# Patient Record
Sex: MALE | Race: WHITE | NOT HISPANIC OR LATINO | URBAN - METROPOLITAN AREA
[De-identification: names, ages, dates, MRNs, and addresses within clinical notes are randomized per-mention and may not be internally consistent; named-entity substitution may affect disease eponyms.]

---

## 2023-12-01 ENCOUNTER — OFFICE VISIT (OUTPATIENT)
Dept: URGENT CARE | Facility: MEDICAL CENTER | Age: 28
End: 2023-12-01
Payer: COMMERCIAL

## 2023-12-01 VITALS
HEART RATE: 109 BPM | SYSTOLIC BLOOD PRESSURE: 138 MMHG | DIASTOLIC BLOOD PRESSURE: 77 MMHG | TEMPERATURE: 100.5 F | OXYGEN SATURATION: 97 % | RESPIRATION RATE: 18 BRPM

## 2023-12-01 DIAGNOSIS — J02.9 SORE THROAT: ICD-10-CM

## 2023-12-01 DIAGNOSIS — K11.20 PAROTITIS: Primary | ICD-10-CM

## 2023-12-01 DIAGNOSIS — U07.1 COVID-19: ICD-10-CM

## 2023-12-01 LAB
SARS-COV-2 AG UPPER RESP QL IA: POSITIVE
VALID CONTROL: ABNORMAL

## 2023-12-01 PROCEDURE — 87811 SARS-COV-2 COVID19 W/OPTIC: CPT

## 2023-12-01 PROCEDURE — 99213 OFFICE O/P EST LOW 20 MIN: CPT

## 2023-12-01 RX ORDER — AMOXICILLIN AND CLAVULANATE POTASSIUM 875; 125 MG/1; MG/1
1 TABLET, FILM COATED ORAL EVERY 12 HOURS SCHEDULED
Qty: 20 TABLET | Refills: 0 | Status: SHIPPED | OUTPATIENT
Start: 2023-12-01 | End: 2023-12-11

## 2023-12-01 RX ORDER — METHYLPREDNISOLONE 4 MG/1
TABLET ORAL
Qty: 21 TABLET | Refills: 0 | Status: SHIPPED | OUTPATIENT
Start: 2023-12-01

## 2023-12-01 NOTE — PROGRESS NOTES
[facility]        NAME: Baltazar Leung is a 33 y.o. male  : 1995    MRN: [id number]  DATE: 2023  TIME: 6:39 PM      Assessment and Plan     Parotitis [K11.20]  1. Parotitis  amoxicillin-clavulanate (AUGMENTIN) 875-125 mg per tablet    methylPREDNISolone 4 MG tablet therapy pack    Ambulatory Referral to Otolaryngology      2. Sore throat  Poct Covid 19 Rapid Antigen Test      3. COVID-19            Rapid covid positive. Patient aware. Patient educated and verbalizes understanding to proceed to the Er if symptoms worsen. Patient Instructions     Take antibiotic as prescribed. Recommend probiotic use while taking antibiotic. Take steroids as directed. Recommend to take them in the morning and with food. Recommend sucking on sour lozenges. Hydration and rest.  Acetaminophen and ibuprofen for pain relief and fever reduction. Follow-up with ENT. PCP follow up in 3-5 days. Go to an emergency department if difficulty breathing occurs or if symptoms worsen. Recommended supplements for COVID-19 is the following: Vitamin D3 2000 IU  daily ,  Vitamin C 1g  every 12 hours , Multivitamin Daily   Chief Complaint     Chief Complaint   Patient presents with    Cold Like Symptoms     Patient c/o sore throat , right ear swelling and fever x 4 days          History of Present Illness     Patient is a 71-year-old male who presents with viral symptoms for 2 weeks. States 3 days ago he started with a sore throat. Reports swelling to the right side of his face in front of his ear. Reports fever and chills. Denies trouble swallowing. Denies drooling. Denies recent antibiotic use. Reports sensation of dry mouth. Review of Systems     Review of Systems   Constitutional:  Positive for chills and fever. HENT:  Positive for congestion, facial swelling and sore throat. Negative for drooling and trouble swallowing. Gastrointestinal:  Negative for diarrhea, nausea and vomiting. All other systems reviewed and are negative. Current Medications       Current Outpatient Medications:     amoxicillin-clavulanate (AUGMENTIN) 875-125 mg per tablet, Take 1 tablet by mouth every 12 (twelve) hours for 10 days, Disp: 20 tablet, Rfl: 0    methylPREDNISolone 4 MG tablet therapy pack, Use as directed on package, Disp: 21 tablet, Rfl: 0    Current Allergies     Allergies as of 12/01/2023    (No Known Allergies)              The following portions of the patient's history were reviewed and updated as appropriate: allergies, current medications, past family history, past medical history, past social history, past surgical history and problem list.     No past medical history on file. No past surgical history on file. No family history on file. Medications have been verified. Objective     /77   Pulse (!) 109   Temp 100.5 °F (38.1 °C)   Resp 18   SpO2 97%   No LMP for male patient. Physical Exam     Physical Exam  Vitals and nursing note reviewed. Constitutional:       General: He is not in acute distress. Appearance: Normal appearance. He is not ill-appearing, toxic-appearing or diaphoretic. HENT:      Head:      Jaw: No trismus. Right Ear: Tympanic membrane, ear canal and external ear normal.      Left Ear: Tympanic membrane, ear canal and external ear normal.      Nose: Congestion present. Mouth/Throat:      Lips: Pink. Mouth: Mucous membranes are moist.      Pharynx: Oropharynx is clear. Uvula midline. No oropharyngeal exudate or posterior oropharyngeal erythema. Tonsils: No tonsillar exudate or tonsillar abscesses. 1+ on the right. 1+ on the left. Cardiovascular:      Rate and Rhythm: Normal rate. Pulses: Normal pulses. Heart sounds: Normal heart sounds, S1 normal and S2 normal.   Pulmonary:      Effort: Pulmonary effort is normal.      Breath sounds: Normal breath sounds and air entry.  No stridor, decreased air movement or transmitted upper airway sounds. No decreased breath sounds, wheezing, rhonchi or rales. Skin:     General: Skin is warm. Capillary Refill: Capillary refill takes less than 2 seconds. Neurological:      General: No focal deficit present. Mental Status: He is alert. Psychiatric:         Mood and Affect: Mood normal.         Behavior: Behavior normal.         Thought Content:  Thought content normal.         Judgment: Judgment normal.

## 2023-12-01 NOTE — PATIENT INSTRUCTIONS
Take antibiotic as prescribed. Recommend probiotic use while taking antibiotic. Take steroids as directed. Recommend to take them in the morning and with food. Recommend sucking on sour lozenges. Hydration and rest.  Acetaminophen and ibuprofen for pain relief and fever reduction. Follow-up with ENT. PCP follow up in 3-5 days. Go to an emergency department if difficulty breathing occurs or if symptoms worsen.     Recommended supplements for COVID-19 is the following: Vitamin D3 2000 IU  daily ,  Vitamin C 1g  every 12 hours , Multivitamin Daily

## 2024-10-29 ENCOUNTER — OFFICE VISIT (OUTPATIENT)
Dept: DENTISTRY | Facility: CLINIC | Age: 29
End: 2024-10-29

## 2024-10-29 VITALS — HEART RATE: 72 BPM | TEMPERATURE: 99.3 F | DIASTOLIC BLOOD PRESSURE: 86 MMHG | SYSTOLIC BLOOD PRESSURE: 124 MMHG

## 2024-10-29 DIAGNOSIS — Z01.21 ENCOUNTER FOR DENTAL EXAMINATION AND CLEANING WITH ABNORMAL FINDINGS: Primary | ICD-10-CM

## 2024-10-29 PROCEDURE — D0220 INTRAORAL - PERIAPICAL FIRST RADIOGRAPHIC IMAGE: HCPCS | Performed by: DENTAL HYGIENIST

## 2024-10-29 PROCEDURE — D0140 LIMITED ORAL EVALUATION - PROBLEM FOCUSED: HCPCS | Performed by: DENTIST

## 2024-10-29 NOTE — DENTAL PROCEDURE DETAILS
Subjective   Patient ID: Terence Foster is a 29 y.o. male.  Chief Complaint   Patient presents with    Emergency/limited Exam     Reviewed medical history   ASA I  Pain - 0    CC:  My front tooth chipped - no pain but I have the piece that chipped    Today;  Limited Exam, 1 PA #9    DX:   Upon clinical and xray examination tooth #9 lost a DL restoration.  Incisal edge is chipped but very slight.  ---Will bring pt back to restore #9 and recommended he return for a Comprehensive exam and FMX    Exam:  Dr. Dale  Referral:  none    NV1:  Rest 9 - DL - -60 min - ASAP w/ any provider  NV2:   Comp, FMX, FMP - 50 min

## 2024-11-07 ENCOUNTER — OFFICE VISIT (OUTPATIENT)
Dept: DENTISTRY | Facility: CLINIC | Age: 29
End: 2024-11-07

## 2024-11-07 VITALS — HEART RATE: 56 BPM | DIASTOLIC BLOOD PRESSURE: 84 MMHG | TEMPERATURE: 99.3 F | SYSTOLIC BLOOD PRESSURE: 133 MMHG

## 2024-11-07 DIAGNOSIS — K02.9 CARIES: Primary | ICD-10-CM

## 2024-11-07 PROCEDURE — D2331 RESIN-BASED COMPOSITE - 2 SURFACES, ANTERIOR: HCPCS | Performed by: DENTIST

## 2024-11-07 NOTE — PROGRESS NOTES
Composite Filling #9-DL    Terence Foster presents for composite filling. PMH reviewed, no changes.  CC- chipped tooth; no discomfort; confirmed #9=DL filling missing.  ASA I  *reviewed Med Hx  Pain Scale 0  Soft tissue: neg    Discussed with patient need for RCT if pulp exposure occurs or in future if pulp is inflamed. Pt understands and consents.    Applied topical benzocaine, administered one half carp 4% Articaine with 1:100,000 Epi Max Ant infiltration    Prepped tooth #9-DL comp with 245 carbide on high speed.  Placed clear matrix.and wedge. Removed.Isolation with cotton rolls and dri-angles    Etch with 37% H2PO4, rinse, dry. Applied Adhese with 20 second scrub once, gentle air dry and light cured for 10s. Restored with Tetric bulk warren shade A2 and light cured.    Refined with finishing burs, polished with enhance point. Verified occlusion and contacts. Pt left satisfied.  NV- Recare

## 2024-11-12 ENCOUNTER — OFFICE VISIT (OUTPATIENT)
Dept: DENTISTRY | Facility: CLINIC | Age: 29
End: 2024-11-12

## 2024-11-12 VITALS — DIASTOLIC BLOOD PRESSURE: 88 MMHG | HEART RATE: 106 BPM | SYSTOLIC BLOOD PRESSURE: 134 MMHG | TEMPERATURE: 99.1 F

## 2024-11-12 DIAGNOSIS — M26.4 MALOCCLUSION: ICD-10-CM

## 2024-11-12 DIAGNOSIS — K05.10 GINGIVITIS: ICD-10-CM

## 2024-11-12 DIAGNOSIS — K02.9 CARIES: Primary | ICD-10-CM

## 2024-11-12 PROCEDURE — D0210 INTRAORAL - COMPLETE SERIES OF RADIOGRAPHIC IMAGES: HCPCS | Performed by: DENTIST

## 2024-11-12 PROCEDURE — D0150 COMPREHENSIVE ORAL EVALUATION - NEW OR ESTABLISHED PATIENT: HCPCS | Performed by: DENTIST

## 2024-11-12 NOTE — DENTAL PROCEDURE DETAILS
"Comprehensive Exam and FMX    Terence Foster 29 y.o. male presents with self to [facility] for comprehensive exam.  PMH reviewed, no changes, ASA I. Significant medical history: denies. Significant allergies: denies. Significant medications: denies.  Pain level: 0/10  Chief complaint: \"Establish dental care\".   Consent:  Reviewed procedures involved with comprehensive exam including radiographs, oral exam, and periodontal probing.   Patient understands and consent was given by self via verbal consent.  Radiographs: FMX.  Oral cancer screening: normal.  Extraoral exam: no remarkable findings.  Intraoral exam: gingival inflammation. Caries. Open bite.   Periodontal exam: See perio charting.   Hygiene - Good.  Plaque - Moderate.  Horizontal bone loss -Localized.   Vertical bone loss - None.  Subgingival calculus - Localized.  BOP - Localized.  Mobility - None.  Furcation involvements - None.  Occlusal trauma - None.  Periodontal Stage: Moderate gingivitis.  Periodontal Grade: A.  Periodontal Plan: Prophy.  Restorative exam:   1- Caries detected: Teeth #2 and #19 existing composite filling with small marginal caries. Tooth #20 occlusal caries.   2- Tooth #31 existing asymptomatic RCT and build up done over 15 years ago. Discussed possible crown restoration. Patient is aware but wanted to wait for now.   Occlusal assessment:  VDO / restorative space - Normal.  AP Classification -  Right: Canine Class I; Molar Class I.  Left: Canine Class I; Molar Class I.  Anterior open bite. Past history of ortho braces. Past history of removal of all wisdom teeth.   Discussed referral to ortho for comprehensive orthodontic evaluation and re-treatment but patient denies.   Tx plan:  1- Prophy.  2- Restorations teeth #2,19,20 occlusal margins.   3- Crown restoration of tooth #31 (patient wanted to wait for now).  4- Refer to ortho but patient denies.    Recommended recall schedule: 6 months.  POI is given. Reviewed oral hygiene and need for " recall visits.   Patient dismissed ambulatory and alert.  NV1: Prophy.  NV2: Restorative care

## 2025-01-27 ENCOUNTER — TELEPHONE (OUTPATIENT)
Dept: DENTISTRY | Facility: CLINIC | Age: 30
End: 2025-01-27